# Patient Record
Sex: MALE | Race: ASIAN | NOT HISPANIC OR LATINO | URBAN - METROPOLITAN AREA
[De-identification: names, ages, dates, MRNs, and addresses within clinical notes are randomized per-mention and may not be internally consistent; named-entity substitution may affect disease eponyms.]

---

## 2024-07-06 ENCOUNTER — INPATIENT (INPATIENT)
Facility: HOSPITAL | Age: 54
LOS: 2 days | Discharge: HOME | DRG: 316 | End: 2024-07-09
Attending: STUDENT IN AN ORGANIZED HEALTH CARE EDUCATION/TRAINING PROGRAM | Admitting: STUDENT IN AN ORGANIZED HEALTH CARE EDUCATION/TRAINING PROGRAM
Payer: COMMERCIAL

## 2024-07-06 VITALS
DIASTOLIC BLOOD PRESSURE: 65 MMHG | TEMPERATURE: 99 F | OXYGEN SATURATION: 98 % | SYSTOLIC BLOOD PRESSURE: 100 MMHG | WEIGHT: 154.98 LBS | RESPIRATION RATE: 18 BRPM | HEIGHT: 70 IN | HEART RATE: 71 BPM

## 2024-07-06 DIAGNOSIS — E78.5 HYPERLIPIDEMIA, UNSPECIFIED: ICD-10-CM

## 2024-07-06 DIAGNOSIS — I42.1 OBSTRUCTIVE HYPERTROPHIC CARDIOMYOPATHY: ICD-10-CM

## 2024-07-06 DIAGNOSIS — Z29.9 ENCOUNTER FOR PROPHYLACTIC MEASURES, UNSPECIFIED: ICD-10-CM

## 2024-07-06 LAB
ALBUMIN SERPL ELPH-MCNC: 4.4 G/DL — SIGNIFICANT CHANGE UP (ref 3.3–5)
ALP SERPL-CCNC: 101 U/L — SIGNIFICANT CHANGE UP (ref 40–120)
ALT FLD-CCNC: 24 U/L — SIGNIFICANT CHANGE UP (ref 10–45)
ANION GAP SERPL CALC-SCNC: 13 MMOL/L — SIGNIFICANT CHANGE UP (ref 5–17)
APTT BLD: 29.1 SEC — SIGNIFICANT CHANGE UP (ref 24.5–35.6)
AST SERPL-CCNC: 21 U/L — SIGNIFICANT CHANGE UP (ref 10–40)
BASOPHILS # BLD AUTO: 0.04 K/UL — SIGNIFICANT CHANGE UP (ref 0–0.2)
BASOPHILS NFR BLD AUTO: 0.5 % — SIGNIFICANT CHANGE UP (ref 0–2)
BILIRUB SERPL-MCNC: 0.6 MG/DL — SIGNIFICANT CHANGE UP (ref 0.2–1.2)
BUN SERPL-MCNC: 14 MG/DL — SIGNIFICANT CHANGE UP (ref 7–23)
CALCIUM SERPL-MCNC: 9.4 MG/DL — SIGNIFICANT CHANGE UP (ref 8.4–10.5)
CHLORIDE SERPL-SCNC: 104 MMOL/L — SIGNIFICANT CHANGE UP (ref 96–108)
CO2 SERPL-SCNC: 22 MMOL/L — SIGNIFICANT CHANGE UP (ref 22–31)
CREAT SERPL-MCNC: 0.92 MG/DL — SIGNIFICANT CHANGE UP (ref 0.5–1.3)
EGFR: 101 ML/MIN/1.73M2 — SIGNIFICANT CHANGE UP
EOSINOPHIL # BLD AUTO: 0.26 K/UL — SIGNIFICANT CHANGE UP (ref 0–0.5)
EOSINOPHIL NFR BLD AUTO: 3 % — SIGNIFICANT CHANGE UP (ref 0–6)
GLUCOSE SERPL-MCNC: 106 MG/DL — HIGH (ref 70–99)
HCT VFR BLD CALC: 45.8 % — SIGNIFICANT CHANGE UP (ref 39–50)
HGB BLD-MCNC: 15.7 G/DL — SIGNIFICANT CHANGE UP (ref 13–17)
IMM GRANULOCYTES NFR BLD AUTO: 0.2 % — SIGNIFICANT CHANGE UP (ref 0–0.9)
INR BLD: 1.08 RATIO — SIGNIFICANT CHANGE UP (ref 0.85–1.18)
LYMPHOCYTES # BLD AUTO: 1.99 K/UL — SIGNIFICANT CHANGE UP (ref 1–3.3)
LYMPHOCYTES # BLD AUTO: 22.8 % — SIGNIFICANT CHANGE UP (ref 13–44)
MCHC RBC-ENTMCNC: 31 PG — SIGNIFICANT CHANGE UP (ref 27–34)
MCHC RBC-ENTMCNC: 34.3 GM/DL — SIGNIFICANT CHANGE UP (ref 32–36)
MCV RBC AUTO: 90.3 FL — SIGNIFICANT CHANGE UP (ref 80–100)
MONOCYTES # BLD AUTO: 0.94 K/UL — HIGH (ref 0–0.9)
MONOCYTES NFR BLD AUTO: 10.8 % — SIGNIFICANT CHANGE UP (ref 2–14)
NEUTROPHILS # BLD AUTO: 5.49 K/UL — SIGNIFICANT CHANGE UP (ref 1.8–7.4)
NEUTROPHILS NFR BLD AUTO: 62.7 % — SIGNIFICANT CHANGE UP (ref 43–77)
NRBC # BLD: 0 /100 WBCS — SIGNIFICANT CHANGE UP (ref 0–0)
PLATELET # BLD AUTO: 172 K/UL — SIGNIFICANT CHANGE UP (ref 150–400)
POTASSIUM SERPL-MCNC: 4.1 MMOL/L — SIGNIFICANT CHANGE UP (ref 3.5–5.3)
POTASSIUM SERPL-SCNC: 4.1 MMOL/L — SIGNIFICANT CHANGE UP (ref 3.5–5.3)
PROT SERPL-MCNC: 7.6 G/DL — SIGNIFICANT CHANGE UP (ref 6–8.3)
PROTHROM AB SERPL-ACNC: 11.3 SEC — SIGNIFICANT CHANGE UP (ref 9.5–13)
RBC # BLD: 5.07 M/UL — SIGNIFICANT CHANGE UP (ref 4.2–5.8)
RBC # FLD: 12.7 % — SIGNIFICANT CHANGE UP (ref 10.3–14.5)
SODIUM SERPL-SCNC: 139 MMOL/L — SIGNIFICANT CHANGE UP (ref 135–145)
TROPONIN T, HIGH SENSITIVITY RESULT: 17 NG/L — SIGNIFICANT CHANGE UP (ref 0–51)
TROPONIN T, HIGH SENSITIVITY RESULT: 17 NG/L — SIGNIFICANT CHANGE UP (ref 0–51)
WBC # BLD: 8.74 K/UL — SIGNIFICANT CHANGE UP (ref 3.8–10.5)
WBC # FLD AUTO: 8.74 K/UL — SIGNIFICANT CHANGE UP (ref 3.8–10.5)

## 2024-07-06 PROCEDURE — 99223 1ST HOSP IP/OBS HIGH 75: CPT

## 2024-07-06 PROCEDURE — 99285 EMERGENCY DEPT VISIT HI MDM: CPT

## 2024-07-06 PROCEDURE — 71045 X-RAY EXAM CHEST 1 VIEW: CPT | Mod: 26

## 2024-07-06 PROCEDURE — 93308 TTE F-UP OR LMTD: CPT | Mod: 26

## 2024-07-06 RX ORDER — HEPARIN SODIUM 50 [USP'U]/ML
4000 INJECTION, SOLUTION INTRAVENOUS ONCE
Refills: 0 | Status: COMPLETED | OUTPATIENT
Start: 2024-07-06 | End: 2024-07-06

## 2024-07-06 RX ORDER — ATORVASTATIN CALCIUM 20 MG/1
10 TABLET, FILM COATED ORAL AT BEDTIME
Refills: 0 | Status: DISCONTINUED | OUTPATIENT
Start: 2024-07-06 | End: 2024-07-09

## 2024-07-06 RX ORDER — METOPROLOL TARTRATE 50 MG
25 TABLET ORAL
Refills: 0 | Status: DISCONTINUED | OUTPATIENT
Start: 2024-07-06 | End: 2024-07-09

## 2024-07-06 RX ORDER — HEPARIN SODIUM 50 [USP'U]/ML
INJECTION, SOLUTION INTRAVENOUS
Qty: 25000 | Refills: 0 | Status: DISCONTINUED | OUTPATIENT
Start: 2024-07-06 | End: 2024-07-06

## 2024-07-06 RX ORDER — ASPIRIN 325 MG/1
162 TABLET, FILM COATED ORAL ONCE
Refills: 0 | Status: COMPLETED | OUTPATIENT
Start: 2024-07-06 | End: 2024-07-06

## 2024-07-06 RX ORDER — HEPARIN SODIUM 50 [USP'U]/ML
4000 INJECTION, SOLUTION INTRAVENOUS EVERY 6 HOURS
Refills: 0 | Status: DISCONTINUED | OUTPATIENT
Start: 2024-07-06 | End: 2024-07-06

## 2024-07-06 RX ORDER — ACETAMINOPHEN 325 MG
650 TABLET ORAL EVERY 6 HOURS
Refills: 0 | Status: DISCONTINUED | OUTPATIENT
Start: 2024-07-06 | End: 2024-07-09

## 2024-07-06 RX ORDER — NICOTINE POLACRILEX 2 MG/1
1 LOZENGE ORAL DAILY
Refills: 0 | Status: DISCONTINUED | OUTPATIENT
Start: 2024-07-06 | End: 2024-07-09

## 2024-07-06 RX ORDER — METOPROLOL TARTRATE 50 MG
25 TABLET ORAL
Refills: 0 | Status: DISCONTINUED | OUTPATIENT
Start: 2024-07-06 | End: 2024-07-06

## 2024-07-06 RX ORDER — TICAGRELOR 90 MG/1
180 TABLET ORAL ONCE
Refills: 0 | Status: COMPLETED | OUTPATIENT
Start: 2024-07-06 | End: 2024-07-06

## 2024-07-06 RX ORDER — ATORVASTATIN CALCIUM 20 MG/1
1 TABLET, FILM COATED ORAL
Refills: 0 | DISCHARGE

## 2024-07-06 RX ADMIN — HEPARIN SODIUM 900 UNIT(S)/HR: 50 INJECTION, SOLUTION INTRAVENOUS at 17:00

## 2024-07-06 RX ADMIN — ASPIRIN 162 MILLIGRAM(S): 325 TABLET, FILM COATED ORAL at 16:46

## 2024-07-06 RX ADMIN — HEPARIN SODIUM 4000 UNIT(S): 50 INJECTION, SOLUTION INTRAVENOUS at 16:59

## 2024-07-06 RX ADMIN — ATORVASTATIN CALCIUM 10 MILLIGRAM(S): 20 TABLET, FILM COATED ORAL at 22:20

## 2024-07-06 RX ADMIN — TICAGRELOR 180 MILLIGRAM(S): 90 TABLET ORAL at 16:46

## 2024-07-06 RX ADMIN — Medication 25 MILLIGRAM(S): at 22:20

## 2024-07-06 NOTE — ED PROVIDER NOTE - PROGRESS NOTE DETAILS
immediately upon the patient's arrival to the [facility] cardiology fellow was made aware of STEMI ecg and evaluated pt at the bedside. -Otis Elmore PA-C Bedside echo suggests the presence of HOCM; and as such patient may not need to be taken urgently to the Cath Lab.  Patient still rates his pain 4 out of 10 chest pressure.  Because these EKG findings and ultrasound findings may all be consistent with HOCM, he may not require urgent cardiac cath.  In the meantime we have loaded him with aspirin, Brilinta, and heparin.  I have discussed this case with the Attending, Dr. Carlos Elliott. Emergency Medicine / Medical Toxicology Attending MD Silva: first troponin 17.  High suspicion that ECG findings are all due to HOCM.  Cardiology does not plan to take to the Cath Lab at this time.  Recommending admission to medicine on telemetry; trend troponin.  We will discuss with unattached hospitalist, Dr. Bobby Thomason

## 2024-07-06 NOTE — CONSULT NOTE ADULT - ATTENDING COMMENTS
53M w/ PMH of active smoking, HTN, HLD presenting with SOB/chest pressure. EKG changes likely secondary to severe LVH, with concern for apical HCM on POCUS    formal echo is pending  agree with starting a low dose BB and discontinuing the hep gtt  may need asa 81 mg in light of his risk factors (not sure if he ever had an ischemic eval in [place])  in light of the risk factors, would consider an ischemic evaluation   would order a cMRI to evaluate apical wall thickness (cMRI shows loss of the usual apical wall thickness tapering due to apical wall thickness exceeding basal wall thickness)

## 2024-07-06 NOTE — H&P ADULT - ASSESSMENT
53 year old male with hx of HLD, active smoking, presenting with chest pain/pressure and SOB. Initial EKG and presentation concerning for HCM. Pt admitted for further workup and management.

## 2024-07-06 NOTE — ED ADULT NURSE NOTE - OBJECTIVE STATEMENT
53 y.o M sent in from PCP p/w c/o chest pain. A+OX4. Per pt daughter states at 0200 this AM was sleeping and was woken up by sudden onset L sided chest pain described as "pressure" rated 8/10 on pain scale. Reports lasted until 0500 AM, didn't take any analgesics. Woke up this AM and went to PCP, had EKG done and was told to come to ER due to MI. Upon initial assessment, STEMI noted on EKG. Associated sx being nausea, no vomiting, SOB, and diaphoresis. Currently denying any nausea, diaphoresis noted. Just moved to [place] x3 wks ago, saw cardiologist in [facility] but never has stress test done. Pain and SOB non-exertional in nature. Current everyday smoker, less than monthly drinker. PMH HTN, HLD. Denies any dizziness/ lightheadedness, vision changes, swelling of extremities, urinary sx, abd pain. No other complaints at this time, daughter at bedside for translation, safety maintained.

## 2024-07-06 NOTE — H&P ADULT - NSHPLABSRESULTS_GEN_ALL_CORE
07-06    139  |  104  |  14  ----------------------------<  106<H>  4.1   |  22  |  0.92    Ca    9.4      06 Jul 2024 16:44    TPro  7.6  /  Alb  4.4  /  TBili  0.6  /  DBili  x   /  AST  21  /  ALT  24  /  AlkPhos  101  07-06          PT/INR - ( 06 Jul 2024 16:44 )   PT: 11.3 sec;   INR: 1.08 ratio         PTT - ( 06 Jul 2024 16:44 )  PTT:29.1 sec              Urinalysis Basic - ( 06 Jul 2024 16:44 )    Color: x / Appearance: x / SG: x / pH: x  Gluc: 106 mg/dL / Ketone: x  / Bili: x / Urobili: x   Blood: x / Protein: x / Nitrite: x   Leuk Esterase: x / RBC: x / WBC x   Sq Epi: x / Non Sq Epi: x / Bacteria: x                              15.7   8.74  )-----------( 172      ( 06 Jul 2024 16:44 )             45.8     CAPILLARY BLOOD GLUCOSE 07-06    139  |  104  |  14  ----------------------------<  106<H>  4.1   |  22  |  0.92    Ca    9.4      06 Jul 2024 16:44    TPro  7.6  /  Alb  4.4  /  TBili  0.6  /  DBili  x   /  AST  21  /  ALT  24  /  AlkPhos  101  07-06      PT/INR - ( 06 Jul 2024 16:44 )   PT: 11.3 sec;   INR: 1.08 ratio         PTT - ( 06 Jul 2024 16:44 )  PTT:29.1 sec              Urinalysis Basic - ( 06 Jul 2024 16:44 )    Color: x / Appearance: x / SG: x / pH: x  Gluc: 106 mg/dL / Ketone: x  / Bili: x / Urobili: x   Blood: x / Protein: x / Nitrite: x   Leuk Esterase: x / RBC: x / WBC x   Sq Epi: x / Non Sq Epi: x / Bacteria: x                          15.7   8.74  )-----------( 172      ( 06 Jul 2024 16:44 )             45.8

## 2024-07-06 NOTE — ED PROVIDER NOTE - OBJECTIVE STATEMENT
53-year-old male PMH of HTN, HLD, smoker for 30 years, occasional EtOH use presents to the ED with sister who interprets in mandrin for the patient complaining of intermittent chest pressure and shortness of breath today which began approximately 2 AM.  Patient traveled to the  from [place] 3 weeks ago.  Patient reports several similar episodes over the past few weeks.  Patient reports diaphoresis.  Patient with active chest pain at this time.  Was seen in outpatient PCP office today and referred to the ED as he began having more severe symptoms at the office.  Upon arrival to ED patient with STEMI ECG in the triage area.

## 2024-07-06 NOTE — ED PROVIDER NOTE - CARE PLAN
1 Principal Discharge DX:	STEMI (ST elevation myocardial infarction)   Principal Discharge DX:	HOCM (hypertrophic obstructive cardiomyopathy)

## 2024-07-06 NOTE — CONSULT NOTE ADULT - SUBJECTIVE AND OBJECTIVE BOX
Dieudonne Caballero MD  Cardiology Fellow  [phone]  All Cardiology service information can be found 24/7 on [email or web address], password: [email or web address]    Patient seen and evaluated at bedside    HPI: 53M w/ PMH of active smoking, HTN, HLD presenting with chest pressure and SOB. Started last night at 2am, lasted 3 hours. Went to PCP today, and was sent to the ED due to abnormal EKG. On route developed chest pressure again. In the ED, initial EKG showing LOLLY in aVR, V1, V2, and widespread depressions, as well as LVH. POCUS was done which shows severe LVH as well as possible apical HCM. Patient initially loaded with ASA, brillinta, heparin. Troponins came back 17 -> 17. Vitally stable.     Per family, patient had an echo done in [facility] that also read concern for apical HCM. Patient does not follow with cardiologist.       Allergies:  No Known Allergies      Current Medications:   heparin   Injectable 4000 Unit(s) IV Push every 6 hours PRN  heparin  Infusion.  Unit(s)/Hr IV Continuous <Continuous>      FAMILY HISTORY:        REVIEW OF SYSTEMS:  CONSTITUTIONAL: No weakness, fevers or chills  EYES/ENT: No visual changes;  No dysphagia  NECK: No pain or stiffness  RESPIRATORY: No cough, wheezing, hemoptysis; No shortness of breath  CARDIOVASCULAR: No chest pain or palpitations; No lower extremity edema  GASTROINTESTINAL: No abdominal or epigastric pain. No nausea, vomiting, or hematemesis; No diarrhea or constipation. No melena or hematochezia.  BACK: No back pain  GENITOURINARY: No dysuria, frequency or hematuria  NEUROLOGICAL: No numbness or weakness  SKIN: No itching, burning, rashes, or lesions   All other review of systems is negative unless indicated above.    Physical Exam:  T(F): 98.4 (07-06), Max: 98.6 (07-06)  HR: 62 (07-06) (62 - 71)  BP: 106/68 (07-06) (100/65 - 130/70)  RR: 20 (07-06)  SpO2: 98% (07-06)  GEN: NAD  HEENT: EOMI, clear sclera  PULM: CTA b/l, no wheeze  CV: RRR S1 S2, no murmur, no JVD  ABD: S, NT, ND  EXT: WWP, no edema  PSYCH: normal affect  SKIN: No rash    CXR: Personally reviewed    Labs: Personally reviewed                        15.7   8.74  )-----------( 172      ( 06 Jul 2024 16:44 )             45.8     07-06    139  |  104  |  14  ----------------------------<  106<H>  4.1   |  22  |  0.92    Ca    9.4      06 Jul 2024 16:44    TPro  7.6  /  Alb  4.4  /  TBili  0.6  /  DBili  x   /  AST  21  /  ALT  24  /  AlkPhos  101  07-06    PT/INR - ( 06 Jul 2024 16:44 )   PT: 11.3 sec;   INR: 1.08 ratio         PTT - ( 06 Jul 2024 16:44 )  PTT:29.1 sec    CARDIAC MARKERS ( 06 Jul 2024 18:01 )  17 ng/L / x     / x     / x     / x     / x      CARDIAC MARKERS ( 06 Jul 2024 16:44 )  17 ng/L / x     / x     / x     / x     / x

## 2024-07-06 NOTE — CONSULT NOTE ADULT - ASSESSMENT
53M w/ PMH of active smoking, HTN, HLD presenting with SOB/chest pressure. EKG changes likely secondary to severe LVH, with concern for apical HCM on POCUS. Will need formal TTE to evaluate intracavitary gradients. May need eventual primary prevention ICD if truly apical HCM.     Recs:  -discontinue heparin gtt and DAPT  -start metoprolol 25mg BID  -obtain A1C, lipid panel  -obtain formal TTE  -monitor on telemetry

## 2024-07-06 NOTE — H&P ADULT - NSHPPHYSICALEXAM_GEN_ALL_CORE
Vital Signs Last 24 Hrs  T(C): 36.9 (06 Jul 2024 16:33), Max: 37 (06 Jul 2024 16:21)  T(F): 98.4 (06 Jul 2024 16:33), Max: 98.6 (06 Jul 2024 16:21)  HR: 63 (06 Jul 2024 20:29) (62 - 71)  BP: 117/75 (06 Jul 2024 20:29) (100/65 - 130/70)  BP(mean): 88 (06 Jul 2024 20:29) (79 - 88)  RR: 20 (06 Jul 2024 20:29) (18 - 20)  SpO2: 99% (06 Jul 2024 20:29) (97% - 99%)    Parameters below as of 06 Jul 2024 20:29  Patient On (Oxygen Delivery Method): room air        CONSTITUTIONAL: Well-groomed, in no apparent distress  EYES: No conjunctival or scleral injection, non-icteric; PERRLA and symmetric  ENMT: No external nasal lesions; nasal mucosa not inflamed; normal dentition; no pharyngeal injection or exudates, oral mucosa with moist membranes  RESPIRATORY: Breathing comfortably; lungs CTA without wheeze/rhonchi/rales  CARDIOVASCULAR: +S1S2, RRR, no M/G/R; no carotid bruits; pedal pulses full and symmetric; no lower extremity edema  GASTROINTESTINAL: No palpable masses or tenderness, +BS throughout, no rebound/guarding; no hepatosplenomegaly; no hernia palpated  GENITOURINARY:  LYMPHATIC: No cervical LAD or tenderness; no axillary LAD or tenderness; no inguinal LAD or tenderness  MUSCULOSKELETAL: Normal gait and station; no digital clubbing or cyanosis; no paraspinal tenderness; no malalignment of extremities  SKIN: No rashes or ulcers noted; no subcutaneous nodules or induration palpable  NEUROLOGIC: CN grossly intact; sensation intact in LEs b/l to light touch; normal strength and tone  PSYCHIATRIC: A+O x 3; mood and affect appropriate; appropriate insight and judgment Vital Signs Last 24 Hrs  T(C): 36.9 (06 Jul 2024 16:33), Max: 37 (06 Jul 2024 16:21)  T(F): 98.4 (06 Jul 2024 16:33), Max: 98.6 (06 Jul 2024 16:21)  HR: 63 (06 Jul 2024 20:29) (62 - 71)  BP: 117/75 (06 Jul 2024 20:29) (100/65 - 130/70)  BP(mean): 88 (06 Jul 2024 20:29) (79 - 88)  RR: 20 (06 Jul 2024 20:29) (18 - 20)  SpO2: 99% (06 Jul 2024 20:29) (97% - 99%)    Parameters below as of 06 Jul 2024 20:29  Patient On (Oxygen Delivery Method): room air        CONSTITUTIONAL: Well-groomed, in no apparent distress  EYES: No conjunctival or scleral injection, non-icteric; PERRLA and symmetric  ENMT: No external nasal lesions; nasal mucosa not inflamed; oral mucosa with moist membranes  RESPIRATORY: Breathing comfortably; lungs CTA without wheeze/rhonchi/rales  CARDIOVASCULAR: +S1S2, RRR, no M/G/R; no lower extremity edema  GASTROINTESTINAL: No palpable masses or tenderness, +BS throughout, no rebound/guarding; no hepatosplenomegaly; no hernia palpated  MUSCULOSKELETAL: No digital clubbing or cyanosis; no paraspinal tenderness; no malalignment of extremities  SKIN: No rashes or ulcers noted; no subcutaneous nodules or induration palpable  NEUROLOGIC: CN grossly intact; sensation intact in LEs b/l to light touch; normal strength and tone  PSYCHIATRIC: A+O x 3; mood and affect appropriate; appropriate insight and judgment

## 2024-07-06 NOTE — H&P ADULT - HISTORY OF PRESENT ILLNESS
53 year old male with hx of HTN, HLD, active smoker, presenting with chest pressure and SOB.  53 year old male with hx of HLD, active smoker, presenting with intermittent chest pain/pressure and SOB. Patient visiting [place] from [place], arrived about 3 weeks ago. Patient endorses intermittent chest pressure and pain for the past 3 years but recently, his symptoms have become more frequent and severe. Pt notes severe midsternal chest pressure that would occur randomly without any specific trigger. He has seen a doctor in [place] and was told that he had a "fat heart". He was prescribed diltiazem 90mg qd but has not been taking it consistently. He smokes actively, about 10+cigarettes daily for the past 30 years. He drinks alcohol occasionally.     In the ED, on arrival, EKG noted to have LOLLY in leads avr, v1, v2 with widespread TWI. POCUS in ED concerning for apical HCM. Pt evaluated by cardiology. Initially started on heparin gtt and DAPT for ACS but was stopped since EKG findings likely due to HCM. Troponin 17 --> 17. Pt admitted for further management.

## 2024-07-06 NOTE — ED PROVIDER NOTE - PHYSICAL EXAMINATION
GEN: Pt in NAD, appears uncomfortable, A&Ox3  PSYCH: Affect appropriate.  EYES: Sclera white w/o injection.   ENT: Head NCAT. MMM. Neck supple FROM.  RESP: CTA b/l   CARDIAC: RRR, clear distinct S1, S2,  ABD: Abdomen soft, non-tender.   VASC: No edema or calf tenderness.  SKIN: No rashes on the trunk.

## 2024-07-06 NOTE — H&P ADULT - NSHPREVIEWOFSYSTEMS_GEN_ALL_CORE
Review of Systems:   CONSTITUTIONAL: No fever, weight loss  EYES: No eye pain, visual disturbances, or discharge  ENMT:  No difficulty hearing, tinnitus, vertigo; No sinus or throat pain  RESPIRATORY: No SOB. No cough, wheezing, chills or hemoptysis  CARDIOVASCULAR: No chest pain, palpitations, dizziness, or leg swelling  GASTROINTESTINAL: No abdominal or epigastric pain. No nausea, vomiting, or hematemesis; No diarrhea or constipation. No melena or hematochezia.  GENITOURINARY: No dysuria, frequency, hematuria, or incontinence  NEUROLOGICAL: No headaches, memory loss, loss of strength, numbness, or tremors  SKIN: No itching, burning, rashes, or lesions   LYMPH NODES: No enlarged glands  ENDOCRINE: No heat or cold intolerance; No hair loss  MUSCULOSKELETAL: No joint pain or swelling; No muscle, back pain  PSYCHIATRIC: No depression, anxiety, mood swings, or difficulty sleeping  HEME/LYMPH: No easy bruising, or bleeding gums Review of Systems:   CONSTITUTIONAL: No fever, weight loss  EYES: No eye pain, visual disturbances, or discharge  ENMT:  No difficulty hearing, tinnitus, vertigo; No sinus or throat pain  RESPIRATORY: No SOB. No cough, wheezing, chills or hemoptysis  CARDIOVASCULAR: No chest pain, palpitations, dizziness, or leg swelling  GASTROINTESTINAL: No abdominal or epigastric pain. No nausea, vomiting, or hematemesis; No diarrhea or constipation. No melena or hematochezia.  GENITOURINARY: No dysuria, frequency, hematuria, or incontinence  NEUROLOGICAL: No headaches, memory loss, loss of strength, numbness, or tremors  SKIN: No itching, burning, rashes, or lesions   MUSCULOSKELETAL: No joint pain or swelling; No muscle, back pain  HEME/LYMPH: No easy bruising, or bleeding gums

## 2024-07-06 NOTE — ED ADULT NURSE NOTE - NSFALLUNIVINTERV_ED_ALL_ED
Bed/Stretcher in lowest position, wheels locked, appropriate side rails in place/Call bell, personal items and telephone in reach/Instruct patient to call for assistance before getting out of bed/chair/stretcher/Non-slip footwear applied when patient is off stretcher/[place] to call system/Physically safe environment - no spills, clutter or unnecessary equipment/Purposeful proactive rounding/Room/bathroom lighting operational, light cord in reach

## 2024-07-06 NOTE — ED PROVIDER NOTE - CLINICAL SUMMARY MEDICAL DECISION MAKING FREE TEXT BOX
Medical Decision Making / Differential Diagnosis:  HPI concerning for STEMI.  Cardiology fellow at the bedside, aspirin, Brilinta ordered, heparin load ordered    ECG directly visualized by me and shows ST elevations in aVR and reciprocal depressions to aVF ST elevations V1 V2 V3 with deep T wave inversions V3-6    Upon my evaluation, this patient had a high probability of imminent or life-threatening deterioration due to STEMI, which required my direct attention, intervention, and personal management.  The patient has a medical condition that impairs on or more vital organ systems.  Frequent personal assessment and adjustment of medical interventions was performed.     I have personally provided 30 minutes of critical care time exclusive of time spent on separately billable procedures.  Time includes review of laboratory data, radiology results, discussion with consultants, patient and family; monitoring for potential decompensation, as well as time spent retrieving data and reviewing the chart and documenting the visit.  Interventions we performed as documented above.

## 2024-07-06 NOTE — H&P ADULT - PROBLEM SELECTOR PLAN 1
EKG findings with LOLLY in avr, v1, v2 and widespread STD initially concerning for STEMI though troponin flat. POCUS also suggestive of apical HCM which may explain EKG findings and symptoms.   - Cardiology consult recommendations appreciated  - Hold ACS management - heparin gtt and DAPT discontinued  - Start metoprolol tartrate 25mg BID  - formal TTE ordered   - F/u cardiology regarding possible ischemic evaluation. ICD placement  - Avoid diuresis, nitroglycerin, arterial vasodilators as can decrease preload and afterload  - Monitor on telemetry

## 2024-07-06 NOTE — ED PROVIDER NOTE - ATTENDING APP SHARED VISIT CONTRIBUTION OF CARE
Emergency Medicine Attending MD Silva:  patient seen and evaluated with the PA.  I was present for key portions of the History and Physical, and I agree with the Impression and Plan.      Patient is a 53-year-old male complaining of less than 24 hours of intermittent substernal chest pressure with associated dyspnea on exertion.  Currently having substernal chest pressure.  Screening ECG in triage indicates STEMI.  Cardiology consulted.    VS: wnl  Gen: Slightly uncomfortable appearing, but overall in minimal distress  Head: NC/AT  Neck: trachea midline  Resp:  No distress  CV: RRR, no RMG  Abd: nondistended  Ext: no deformities  Neuro:  A&Ox4 appears non focal  Skin:  Warm and dry as visualized  Psych: appropriate    Medical Decision Making / Differential Diagnosis:  HPI concerning for STEMI.  Cardiology fellow at the bedside, aspirin, Brilinta ordered, heparin load ordered    ECG directly visualized by me and shows ST elevations in aVR and reciprocal depressions to aVF ST elevations V1 V2 V3 with deep T wave inversions V3-6    Upon my evaluation, this patient had a high probability of imminent or life-threatening deterioration due to STEMI, which required my direct attention, intervention, and personal management.  The patient has a medical condition that impairs on or more vital organ systems.  Frequent personal assessment and adjustment of medical interventions was performed.     I have personally provided 30 minutes of critical care time exclusive of time spent on separately billable procedures.  Time includes review of laboratory data, radiology results, discussion with consultants, patient and family; monitoring for potential decompensation, as well as time spent retrieving data and reviewing the chart and documenting the visit.  Interventions we performed as documented above.

## 2024-07-07 LAB
A1C WITH ESTIMATED AVERAGE GLUCOSE RESULT: 5.7 % — HIGH (ref 4–5.6)
ANION GAP SERPL CALC-SCNC: 13 MMOL/L — SIGNIFICANT CHANGE UP (ref 5–17)
BUN SERPL-MCNC: 14 MG/DL — SIGNIFICANT CHANGE UP (ref 7–23)
CALCIUM SERPL-MCNC: 9.3 MG/DL — SIGNIFICANT CHANGE UP (ref 8.4–10.5)
CHLORIDE SERPL-SCNC: 108 MMOL/L — SIGNIFICANT CHANGE UP (ref 96–108)
CHOLEST SERPL-MCNC: 152 MG/DL — SIGNIFICANT CHANGE UP
CO2 SERPL-SCNC: 19 MMOL/L — LOW (ref 22–31)
CREAT SERPL-MCNC: 0.77 MG/DL — SIGNIFICANT CHANGE UP (ref 0.5–1.3)
EGFR: 108 ML/MIN/1.73M2 — SIGNIFICANT CHANGE UP
ESTIMATED AVERAGE GLUCOSE: 117 MG/DL — HIGH (ref 68–114)
GLUCOSE SERPL-MCNC: 98 MG/DL — SIGNIFICANT CHANGE UP (ref 70–99)
HCT VFR BLD CALC: 46.7 % — SIGNIFICANT CHANGE UP (ref 39–50)
HDLC SERPL-MCNC: 39 MG/DL — LOW
HGB BLD-MCNC: 15.9 G/DL — SIGNIFICANT CHANGE UP (ref 13–17)
LIPID PNL WITH DIRECT LDL SERPL: 75 MG/DL — SIGNIFICANT CHANGE UP
MCHC RBC-ENTMCNC: 31.2 PG — SIGNIFICANT CHANGE UP (ref 27–34)
MCHC RBC-ENTMCNC: 34 GM/DL — SIGNIFICANT CHANGE UP (ref 32–36)
MCV RBC AUTO: 91.6 FL — SIGNIFICANT CHANGE UP (ref 80–100)
NON HDL CHOLESTEROL: 113 MG/DL — SIGNIFICANT CHANGE UP
NRBC # BLD: 0 /100 WBCS — SIGNIFICANT CHANGE UP (ref 0–0)
PLATELET # BLD AUTO: 177 K/UL — SIGNIFICANT CHANGE UP (ref 150–400)
POTASSIUM SERPL-MCNC: 4.1 MMOL/L — SIGNIFICANT CHANGE UP (ref 3.5–5.3)
POTASSIUM SERPL-SCNC: 4.1 MMOL/L — SIGNIFICANT CHANGE UP (ref 3.5–5.3)
RBC # BLD: 5.1 M/UL — SIGNIFICANT CHANGE UP (ref 4.2–5.8)
RBC # FLD: 13 % — SIGNIFICANT CHANGE UP (ref 10.3–14.5)
SODIUM SERPL-SCNC: 140 MMOL/L — SIGNIFICANT CHANGE UP (ref 135–145)
TRIGL SERPL-MCNC: 229 MG/DL — HIGH
TSH SERPL-MCNC: 2.21 UIU/ML — SIGNIFICANT CHANGE UP (ref 0.27–4.2)
WBC # BLD: 6.28 K/UL — SIGNIFICANT CHANGE UP (ref 3.8–10.5)
WBC # FLD AUTO: 6.28 K/UL — SIGNIFICANT CHANGE UP (ref 3.8–10.5)

## 2024-07-07 PROCEDURE — 99253 IP/OBS CNSLTJ NEW/EST LOW 45: CPT | Mod: GC

## 2024-07-07 PROCEDURE — 99232 SBSQ HOSP IP/OBS MODERATE 35: CPT

## 2024-07-07 RX ADMIN — NICOTINE POLACRILEX 1 PATCH: 2 LOZENGE ORAL at 17:59

## 2024-07-07 RX ADMIN — NICOTINE POLACRILEX 1 PATCH: 2 LOZENGE ORAL at 20:37

## 2024-07-07 RX ADMIN — ATORVASTATIN CALCIUM 10 MILLIGRAM(S): 20 TABLET, FILM COATED ORAL at 21:37

## 2024-07-07 RX ADMIN — Medication 25 MILLIGRAM(S): at 18:00

## 2024-07-07 NOTE — PROGRESS NOTE ADULT - SUBJECTIVE AND OBJECTIVE BOX
[facility] Division of Hospital Medicine  Angela Lee DO  Available on Teams Xander    Patient is a 53y old  Male who presents with a chief complaint of Chest pain (06 Jul 2024 20:33)      SUBJECTIVE / OVERNIGHT EVENTS: none. Feels well, no shortness of breath, chest pain, has been walking around without difficulty. Wife at bedside to provide translation, they declined .   ADDITIONAL REVIEW OF SYSTEMS: negative    MEDICATIONS  (STANDING):  atorvastatin 10 milliGRAM(s) Oral at bedtime  metoprolol tartrate 25 milliGRAM(s) Oral two times a day    MEDICATIONS  (PRN):  acetaminophen     Tablet .. 650 milliGRAM(s) Oral every 6 hours PRN Temp greater or equal to 38C (100.4F), Mild Pain (1 - 3)  nicotine - 21 mG/24Hr(s) Patch 1 Patch Transdermal daily PRN nicotine craving      CAPILLARY BLOOD GLUCOSE        I&O's Summary    06 Jul 2024 07:01  -  07 Jul 2024 07:00  --------------------------------------------------------  IN: 0 mL / OUT: 200 mL / NET: -200 mL    07 Jul 2024 07:01  -  07 Jul 2024 11:31  --------------------------------------------------------  IN: 100 mL / OUT: 0 mL / NET: 100 mL        PHYSICAL EXAM:  Vital Signs Last 24 Hrs  T(C): 36.7 (07 Jul 2024 05:30), Max: 37 (06 Jul 2024 16:21)  T(F): 98 (07 Jul 2024 05:30), Max: 98.6 (06 Jul 2024 16:21)  HR: 60 (07 Jul 2024 05:30) (59 - 71)  BP: 114/72 (07 Jul 2024 05:30) (100/65 - 130/70)  BP(mean): 88 (06 Jul 2024 20:29) (79 - 88)  RR: 18 (07 Jul 2024 05:30) (18 - 20)  SpO2: 98% (07 Jul 2024 05:30) (97% - 99%)    Parameters below as of 07 Jul 2024 05:30  Patient On (Oxygen Delivery Method): room air        CONSTITUTIONAL: Well-groomed, in no apparent distress  EYES: No conjunctival or scleral injection, non-icteric; PERRLA and symmetric  ENMT: No external nasal lesions; no pharyngeal injection or exudates, oral mucosa with moist membranes  NECK: Trachea midline without palpable neck mass; thyroid not enlarged and non-tender  RESPIRATORY: Breathing comfortably; lungs CTA without wheeze/rhonchi/rales  CARDIOVASCULAR: +S1S2, RRR, no M/G/R; pedal pulses full and symmetric; no lower extremity edema  GASTROINTESTINAL: No palpable masses or tenderness, +BS throughout, no rebound/guarding  MUSCULOSKELETAL: no digital clubbing or cyanosis; no paraspinal tenderness; normal strength and tone of extremities  SKIN: No rashes or ulcers noted; no subcutaneous nodules or induration palpable  NEUROLOGIC: CN II-XII intact; sensation intact in LEs b/l to light touch  PSYCHIATRIC: A+O x 3; mood and affect appropriate; appropriate insight and judgment    LABS:                        15.9   6.28  )-----------( 177      ( 07 Jul 2024 07:12 )             46.7     07-07    140  |  108  |  14  ----------------------------<  98  4.1   |  19<L>  |  0.77    Ca    9.3      07 Jul 2024 07:18    TPro  7.6  /  Alb  4.4  /  TBili  0.6  /  DBili  x   /  AST  21  /  ALT  24  /  AlkPhos  101  07-06    PT/INR - ( 06 Jul 2024 16:44 )   PT: 11.3 sec;   INR: 1.08 ratio         PTT - ( 06 Jul 2024 16:44 )  PTT:29.1 sec      Urinalysis Basic - ( 07 Jul 2024 07:18 )    Color: x / Appearance: x / SG: x / pH: x  Gluc: 98 mg/dL / Ketone: x  / Bili: x / Urobili: x   Blood: x / Protein: x / Nitrite: x   Leuk Esterase: x / RBC: x / WBC x   Sq Epi: x / Non Sq Epi: x / Bacteria: x

## 2024-07-07 NOTE — CHART NOTE - NSCHARTNOTEFT_GEN_A_CORE
BRUNO HENDRIX    Notified by RN patient with Rt forearm swollen. Seen at bedside in NAD, when question about R arm, his wife at bedside said it's good now, no more swollen. Rt radial pulse palpable, no c/o pain for now. VSS.       Interventions taken     may need doppler to r/o DVT if c/o pain, edema, or redness.  cont assess and monitor  f/u with am team.                    Pipe La ANP-BC  Spectralink #[id number]

## 2024-07-08 LAB
GLUCOSE BLDC GLUCOMTR-MCNC: 90 MG/DL — SIGNIFICANT CHANGE UP (ref 70–99)
HCT VFR BLD CALC: 42.9 % — SIGNIFICANT CHANGE UP (ref 39–50)
HGB BLD-MCNC: 15.1 G/DL — SIGNIFICANT CHANGE UP (ref 13–17)
MCHC RBC-ENTMCNC: 30.9 PG — SIGNIFICANT CHANGE UP (ref 27–34)
MCHC RBC-ENTMCNC: 35.2 GM/DL — SIGNIFICANT CHANGE UP (ref 32–36)
MCV RBC AUTO: 87.7 FL — SIGNIFICANT CHANGE UP (ref 80–100)
NRBC # BLD: 0 /100 WBCS — SIGNIFICANT CHANGE UP (ref 0–0)
PLATELET # BLD AUTO: 163 K/UL — SIGNIFICANT CHANGE UP (ref 150–400)
RBC # BLD: 4.89 M/UL — SIGNIFICANT CHANGE UP (ref 4.2–5.8)
RBC # FLD: 12.6 % — SIGNIFICANT CHANGE UP (ref 10.3–14.5)
WBC # BLD: 6.19 K/UL — SIGNIFICANT CHANGE UP (ref 3.8–10.5)
WBC # FLD AUTO: 6.19 K/UL — SIGNIFICANT CHANGE UP (ref 3.8–10.5)

## 2024-07-08 PROCEDURE — 93306 TTE W/DOPPLER COMPLETE: CPT | Mod: 26

## 2024-07-08 PROCEDURE — 99232 SBSQ HOSP IP/OBS MODERATE 35: CPT

## 2024-07-08 PROCEDURE — 75561 CARDIAC MRI FOR MORPH W/DYE: CPT | Mod: 26

## 2024-07-08 PROCEDURE — 93356 MYOCRD STRAIN IMG SPCKL TRCK: CPT

## 2024-07-08 PROCEDURE — 99233 SBSQ HOSP IP/OBS HIGH 50: CPT | Mod: GC

## 2024-07-08 RX ADMIN — Medication 25 MILLIGRAM(S): at 21:33

## 2024-07-08 RX ADMIN — ATORVASTATIN CALCIUM 10 MILLIGRAM(S): 20 TABLET, FILM COATED ORAL at 21:33

## 2024-07-08 NOTE — PROGRESS NOTE ADULT - ASSESSMENT
53M w/ PMH of active smoking, HTN, HLD presenting with SOB/chest pressure. EKG changes likely secondary to severe LVH, with concern for apical HCM on POCUS. Will need formal TTE to evaluate intracavitary gradients. May need eventual primary prevention ICD if truly apical HCM.     Cardiac studies:  - none     #chest pressure  - trops wnl, EKG findings iso severe LVH   - no concern for ACS at this moment     #cardiomyopathy   POCUS showing concern for apical HCM? Prior TTE with similar results as per wife at bedside.   - c/w Lopressor 25mg BID  - obtain A1C, lipid panel  - pending formal TTE   - pending cMRI     Case discussed with Dr. Louie.     NOTE INCOMPLETE UNTIL ATTENDING ATTESTATION.  53M w/ PMH of active smoking, HTN, HLD presenting with SOB/chest pressure. EKG changes likely secondary to severe LVH, with concern for apical HCM on POCUS. Will need formal TTE to evaluate intracavitary gradients. May need eventual primary prevention ICD if truly apical HCM.     Cardiac studies:  - none     #chest pressure  - trops wnl, EKG findings iso severe LVH   - no concern for ACS at this moment     #cardiomyopathy   POCUS showing concern for apical HCM? Prior TTE with similar results as per wife at bedside.   - c/w Lopressor 25mg BID  - obtain A1C, lipid panel  - pending formal TTE   - pending cMRI     Case discussed with Dr. Boyd and Dr. Louie.     NOTE INCOMPLETE UNTIL ATTENDING ATTESTATION.  52 yo M w/ PMH of active smoking, HTN and HLD.   Presented with SOB/chest pressure. EKG changes seen, likely secondary to severe LVH, with concern for ?apical HCM on POCUS. Will need formal TTE to evaluate intracavitary gradients. May need eventual primary prevention ICD if truly apical HCM.     Cardiac studies:  - none       REC:  #1.  chest pressure  - trops wnl, EKG findings iso severe LVH   - no concern for ACS at this moment     #2.  cardiomyopathy - POCUS showing concern for apical HCM? Prior TTE with similar results as per wife at bedside.   - c/w Lopressor 25mg BID  - obtain A1C, lipid panel  - pending formal TTE   - pending cMRI     Case discussed with Dr. Boyd and Dr. Louie.       Omar Kaur MD  Cardiology resident     Plan discussed with cardiology fellow and resident.  Patient seen and examined.  Hx., exam and labs as above.  I agree with the assessment and recommendations, which I have reviewed and edited where appropriate.  Jovani Boyd M.D.  Cardiology Attending, Consult Service    For Cardiology consults and questions, all Cardiology service information can be found 24/7 on [email or web address] - use password: [email or web address] to log in.

## 2024-07-08 NOTE — PROGRESS NOTE ADULT - SUBJECTIVE AND OBJECTIVE BOX
[facility] Division of Hospital Medicine  Sharon Galvan DO   Available via Microsoft Teams      Patient is a 53y old  Male who presents with a chief complaint of Chest pain (07 Jul 2024 11:30)      SUBJECTIVE / OVERNIGHT EVENTS:  No CP, SOB, dizziness     MEDICATIONS  (STANDING):  atorvastatin 10 milliGRAM(s) Oral at bedtime  metoprolol tartrate 25 milliGRAM(s) Oral two times a day    MEDICATIONS  (PRN):  acetaminophen     Tablet .. 650 milliGRAM(s) Oral every 6 hours PRN Temp greater or equal to 38C (100.4F), Mild Pain (1 - 3)  nicotine - 21 mG/24Hr(s) Patch 1 Patch Transdermal daily PRN nicotine craving      CAPILLARY BLOOD GLUCOSE      POCT Blood Glucose.: 90 mg/dL (08 Jul 2024 17:21)    I&O's Summary    07 Jul 2024 07:01  -  08 Jul 2024 07:00  --------------------------------------------------------  IN: 100 mL / OUT: 0 mL / NET: 100 mL    08 Jul 2024 07:01  -  08 Jul 2024 17:37  --------------------------------------------------------  IN: 240 mL / OUT: 0 mL / NET: 240 mL        PHYSICAL EXAM:  Vital Signs Last 24 Hrs  T(C): 36.3 (08 Jul 2024 04:51), Max: 37.1 (07 Jul 2024 21:47)  T(F): 97.3 (08 Jul 2024 04:51), Max: 98.7 (07 Jul 2024 21:47)  HR: 52 (08 Jul 2024 04:51) (52 - 59)  BP: 112/70 (08 Jul 2024 04:51) (112/70 - 132/77)  BP(mean): --  RR: 17 (08 Jul 2024 04:51) (17 - 18)  SpO2: 97% (08 Jul 2024 04:51) (97% - 98%)    Parameters below as of 08 Jul 2024 04:51  Patient On (Oxygen Delivery Method): room air      CONSTITUTIONAL: NAD, well-developed, well-groomed  EYES: conjunctiva and sclera clear  ENMT: Moist oral mucosa  RESPIRATORY: Normal respiratory effort; lungs are clear to auscultation bilaterally  CARDIOVASCULAR: Regular rate and rhythm, normal S1 and S2; No lower extremity edema  ABDOMEN: Nontender to palpation, normoactive bowel sounds, no rebound/guarding  MUSCULOSKELETAL:  No clubbing or cyanosis of digits; no joint swelling or tenderness to palpation  PSYCH: A+O to person, place, and time; affect appropriate  SKIN: No rashes; no palpable lesions    LABS:                        15.1   6.19  )-----------( 163      ( 08 Jul 2024 07:15 )             42.9     07-07    140  |  108  |  14  ----------------------------<  98  4.1   |  19<L>  |  0.77    Ca    9.3      07 Jul 2024 07:18            Urinalysis Basic - ( 07 Jul 2024 07:18 )    Color: x / Appearance: x / SG: x / pH: x  Gluc: 98 mg/dL / Ketone: x  / Bili: x / Urobili: x   Blood: x / Protein: x / Nitrite: x   Leuk Esterase: x / RBC: x / WBC x   Sq Epi: x / Non Sq Epi: x / Bacteria: x          RADIOLOGY & ADDITIONAL TESTS:  Results Reviewed:   Imaging Personally Reviewed:  Electrocardiogram Personally Reviewed:    COORDINATION OF CARE:  Care Discussed with Consultants/Other Providers [Y/N]:  Prior or Outpatient Records Reviewed [Y/N]:

## 2024-07-08 NOTE — PROVIDER CONTACT NOTE (OTHER) - ASSESSMENT
right arm very slightly swollen fingers slightly puffy. radial pulse palpable, no redness noted, denies pain
asymptomatic, pt is sleeping

## 2024-07-08 NOTE — PROGRESS NOTE ADULT - SUBJECTIVE AND OBJECTIVE BOX
Progress Note    07-06-24 (2d)    Patient is a 53y old  Male who presents with a chief complaint of Chest pain (07 Jul 2024 11:30)      Subjective / Overnight Events :  - No acute events overnight.  - Pt seen and examined at bedside.     Additional ROS (if any):    MEDICATIONS  (STANDING):  atorvastatin 10 milliGRAM(s) Oral at bedtime  metoprolol tartrate 25 milliGRAM(s) Oral two times a day    MEDICATIONS  (PRN):  acetaminophen     Tablet .. 650 milliGRAM(s) Oral every 6 hours PRN Temp greater or equal to 38C (100.4F), Mild Pain (1 - 3)  nicotine - 21 mG/24Hr(s) Patch 1 Patch Transdermal daily PRN nicotine craving          PHYSICAL EXAM:  Vital Signs Last 24 Hrs  T(C): 36.3 (08 Jul 2024 04:51), Max: 37.1 (07 Jul 2024 21:47)  T(F): 97.3 (08 Jul 2024 04:51), Max: 98.7 (07 Jul 2024 21:47)  HR: 52 (08 Jul 2024 04:51) (52 - 61)  BP: 112/70 (08 Jul 2024 04:51) (112/70 - 132/77)  BP(mean): --  RR: 17 (08 Jul 2024 04:51) (17 - 18)  SpO2: 97% (08 Jul 2024 04:51) (96% - 98%)    Parameters below as of 08 Jul 2024 04:51  Patient On (Oxygen Delivery Method): room air        I&O's Summary    06 Jul 2024 07:01  -  07 Jul 2024 07:00  --------------------------------------------------------  IN: 0 mL / OUT: 200 mL / NET: -200 mL    07 Jul 2024 07:01  -  08 Jul 2024 05:24  --------------------------------------------------------  IN: 100 mL / OUT: 0 mL / NET: 100 mL        General: NAD, non-toxic appearing   HEENT: PERRLA, EOMi, no scleral icterus  CV: RRR, normal S1 and S2, no m/r/g  Lungs: normal respiratory effort. CTAB, no wheezes, rales, or rhonchi  Abd: soft, nontender, nondistended  Ext: no edema, 2+ peripheral pulses   Pysch: AAOx3, appropriate affect   Neuro: grossly non-focal, moving all extremities spontaneously   Skin: no rashes or lesions     LVAD  Speed: (RPM)  Flow:  PI:   Power: (Castro)     LABS:  CAPILLARY BLOOD GLUCOSE                                  15.9   6.28  )-----------( 177      ( 07 Jul 2024 07:12 )             46.7       WBC Trend: 6.28<--, 8.74<--  Hb Trend: 15.9<--, 15.7<--    07-07    140  |  108  |  14  ----------------------------<  98  4.1   |  19<L>  |  0.77    Ca    9.3      07 Jul 2024 07:18    TPro  7.6  /  Alb  4.4  /  TBili  0.6  /  DBili  x   /  AST  21  /  ALT  24  /  AlkPhos  101  07-06    PT/INR - ( 06 Jul 2024 16:44 )   PT: 11.3 sec;   INR: 1.08 ratio         PTT - ( 06 Jul 2024 16:44 )  PTT:29.1 sec      Urinalysis Basic - ( 07 Jul 2024 07:18 )    Color: x / Appearance: x / SG: x / pH: x  Gluc: 98 mg/dL / Ketone: x  / Bili: x / Urobili: x   Blood: x / Protein: x / Nitrite: x   Leuk Esterase: x / RBC: x / WBC x   Sq Epi: x / Non Sq Epi: x / Bacteria: x            RADIOLOGY & ADDITIONAL TESTS: Reviewed Progress Note    07-06-24 (2d)    Patient is a 53y old  Male who presents with a chief complaint of Chest pain (07 Jul 2024 11:30)      Subjective / Overnight Events :  No events overnight.     Additional ROS (if any):    MEDICATIONS  (STANDING):  atorvastatin 10 milliGRAM(s) Oral at bedtime  metoprolol tartrate 25 milliGRAM(s) Oral two times a day    MEDICATIONS  (PRN):  acetaminophen     Tablet .. 650 milliGRAM(s) Oral every 6 hours PRN Temp greater or equal to 38C (100.4F), Mild Pain (1 - 3)  nicotine - 21 mG/24Hr(s) Patch 1 Patch Transdermal daily PRN nicotine craving          PHYSICAL EXAM:  Vital Signs Last 24 Hrs  T(C): 36.3 (08 Jul 2024 04:51), Max: 37.1 (07 Jul 2024 21:47)  T(F): 97.3 (08 Jul 2024 04:51), Max: 98.7 (07 Jul 2024 21:47)  HR: 52 (08 Jul 2024 04:51) (52 - 61)  BP: 112/70 (08 Jul 2024 04:51) (112/70 - 132/77)  BP(mean): --  RR: 17 (08 Jul 2024 04:51) (17 - 18)  SpO2: 97% (08 Jul 2024 04:51) (96% - 98%)    Parameters below as of 08 Jul 2024 04:51  Patient On (Oxygen Delivery Method): room air        I&O's Summary    06 Jul 2024 07:01  -  07 Jul 2024 07:00  --------------------------------------------------------  IN: 0 mL / OUT: 200 mL / NET: -200 mL    07 Jul 2024 07:01  -  08 Jul 2024 05:24  --------------------------------------------------------  IN: 100 mL / OUT: 0 mL / NET: 100 mL        General: NAD, non-toxic appearing   HEENT: PERRLA, EOMi, no scleral icterus  CV: RRR, normal S1 and S2, no m/r/g  Lungs: normal respiratory effort. CTAB, no wheezes, rales, or rhonchi  Abd: soft, nontender, nondistended  Ext: no edema, 2+ peripheral pulses       LABS:  CAPILLARY BLOOD GLUCOSE                                  15.9   6.28  )-----------( 177      ( 07 Jul 2024 07:12 )             46.7       WBC Trend: 6.28<--, 8.74<--  Hb Trend: 15.9<--, 15.7<--    07-07    140  |  108  |  14  ----------------------------<  98  4.1   |  19<L>  |  0.77    Ca    9.3      07 Jul 2024 07:18    TPro  7.6  /  Alb  4.4  /  TBili  0.6  /  DBili  x   /  AST  21  /  ALT  24  /  AlkPhos  101  07-06    PT/INR - ( 06 Jul 2024 16:44 )   PT: 11.3 sec;   INR: 1.08 ratio         PTT - ( 06 Jul 2024 16:44 )  PTT:29.1 sec      Urinalysis Basic - ( 07 Jul 2024 07:18 )    Color: x / Appearance: x / SG: x / pH: x  Gluc: 98 mg/dL / Ketone: x  / Bili: x / Urobili: x   Blood: x / Protein: x / Nitrite: x   Leuk Esterase: x / RBC: x / WBC x   Sq Epi: x / Non Sq Epi: x / Bacteria: x            RADIOLOGY & ADDITIONAL TESTS: Reviewed Progress Note  07-06-24 (2d)      Patient is a 53y old  Male who presents with a chief complaint of Chest pain (07 Jul 2024 11:30)    Subjective / Overnight Events :  No events overnight.       MEDICATIONS  (STANDING):  atorvastatin 10 milliGRAM(s) Oral at bedtime  metoprolol tartrate 25 milliGRAM(s) Oral two times a day    MEDICATIONS  (PRN):  acetaminophen     Tablet .. 650 milliGRAM(s) Oral every 6 hours PRN Temp greater or equal to 38C (100.4F), Mild Pain (1 - 3)  nicotine - 21 mG/24Hr(s) Patch 1 Patch Transdermal daily PRN nicotine craving        PHYSICAL EXAM:  Vital Signs Last 24 Hrs  T(C): 36.3 (08 Jul 2024 04:51), Max: 37.1 (07 Jul 2024 21:47)  T(F): 97.3 (08 Jul 2024 04:51), Max: 98.7 (07 Jul 2024 21:47)  HR: 52 (08 Jul 2024 04:51) (52 - 61)  BP: 112/70 (08 Jul 2024 04:51) (112/70 - 132/77)  RR: 17 (08 Jul 2024 04:51) (17 - 18)  SpO2: 97% (08 Jul 2024 04:51) (96% - 98%)      I&O's Summary  06 Jul 2024 07:01  -  07 Jul 2024 07:00  --------------------------------------------------------  IN: 0 mL / OUT: 200 mL / NET: -200 mL    07 Jul 2024 07:01  -  08 Jul 2024 05:24  --------------------------------------------------------  IN: 100 mL / OUT: 0 mL / NET: 100 mL        General: NAD, non-toxic appearing   HEENT: PERRLA, EOMi, no scleral icterus  CV: RRR, normal S1 and S2, no m/r/g  Lungs: normal respiratory effort. CTAB, no wheezes, rales, or rhonchi  Abd: soft, nontender, nondistended  Ext: no edema, 2+ peripheral pulses       LABS:                    15.9   6.28  )-----------( 177      ( 07 Jul 2024 07:12 )             46.7     WBC Trend: 6.28<--, 8.74<--  Hb Trend: 15.9<--, 15.7<--    07-07  140  |  108  |  14  ----------------------------<  98  4.1   |  19<L>  |  0.77    Ca    9.3      07 Jul 2024 07:18    TPro  7.6  /  Alb  4.4  /  TBili  0.6  /  DBili  x   /  AST  21  /  ALT  24  /  AlkPhos  101  07-06    PT/INR - ( 06 Jul 2024 16:44 )   PT: 11.3 sec;   INR: 1.08 ratio    PTT - ( 06 Jul 2024 16:44 )  PTT:29.1 sec

## 2024-07-08 NOTE — PROVIDER CONTACT NOTE (OTHER) - ACTION/TREATMENT ORDERED:
none, ACP aware.
none, ACP aware, after 30 minutes it got better, pt verbalized relief of discomfort.

## 2024-07-09 VITALS
SYSTOLIC BLOOD PRESSURE: 110 MMHG | DIASTOLIC BLOOD PRESSURE: 65 MMHG | HEART RATE: 56 BPM | OXYGEN SATURATION: 96 % | RESPIRATION RATE: 18 BRPM | TEMPERATURE: 98 F

## 2024-07-09 LAB
HCT VFR BLD CALC: 42.9 % — SIGNIFICANT CHANGE UP (ref 39–50)
HGB BLD-MCNC: 15.1 G/DL — SIGNIFICANT CHANGE UP (ref 13–17)
MCHC RBC-ENTMCNC: 30.9 PG — SIGNIFICANT CHANGE UP (ref 27–34)
MCHC RBC-ENTMCNC: 35.2 GM/DL — SIGNIFICANT CHANGE UP (ref 32–36)
MCV RBC AUTO: 87.9 FL — SIGNIFICANT CHANGE UP (ref 80–100)
NRBC # BLD: 0 /100 WBCS — SIGNIFICANT CHANGE UP (ref 0–0)
PLATELET # BLD AUTO: 172 K/UL — SIGNIFICANT CHANGE UP (ref 150–400)
RBC # BLD: 4.88 M/UL — SIGNIFICANT CHANGE UP (ref 4.2–5.8)
RBC # FLD: 12.5 % — SIGNIFICANT CHANGE UP (ref 10.3–14.5)
WBC # BLD: 6.34 K/UL — SIGNIFICANT CHANGE UP (ref 3.8–10.5)
WBC # FLD AUTO: 6.34 K/UL — SIGNIFICANT CHANGE UP (ref 3.8–10.5)

## 2024-07-09 PROCEDURE — 71045 X-RAY EXAM CHEST 1 VIEW: CPT

## 2024-07-09 PROCEDURE — 82962 GLUCOSE BLOOD TEST: CPT

## 2024-07-09 PROCEDURE — 93308 TTE F-UP OR LMTD: CPT

## 2024-07-09 PROCEDURE — 93005 ELECTROCARDIOGRAM TRACING: CPT

## 2024-07-09 PROCEDURE — 85027 COMPLETE CBC AUTOMATED: CPT

## 2024-07-09 PROCEDURE — 93306 TTE W/DOPPLER COMPLETE: CPT

## 2024-07-09 PROCEDURE — 99285 EMERGENCY DEPT VISIT HI MDM: CPT

## 2024-07-09 PROCEDURE — A9585: CPT

## 2024-07-09 PROCEDURE — 84484 ASSAY OF TROPONIN QUANT: CPT

## 2024-07-09 PROCEDURE — 93356 MYOCRD STRAIN IMG SPCKL TRCK: CPT

## 2024-07-09 PROCEDURE — 36415 COLL VENOUS BLD VENIPUNCTURE: CPT

## 2024-07-09 PROCEDURE — 84443 ASSAY THYROID STIM HORMONE: CPT

## 2024-07-09 PROCEDURE — 85610 PROTHROMBIN TIME: CPT

## 2024-07-09 PROCEDURE — 85730 THROMBOPLASTIN TIME PARTIAL: CPT

## 2024-07-09 PROCEDURE — 85025 COMPLETE CBC W/AUTO DIFF WBC: CPT

## 2024-07-09 PROCEDURE — 96374 THER/PROPH/DIAG INJ IV PUSH: CPT

## 2024-07-09 PROCEDURE — 80048 BASIC METABOLIC PNL TOTAL CA: CPT

## 2024-07-09 PROCEDURE — 80061 LIPID PANEL: CPT

## 2024-07-09 PROCEDURE — 99233 SBSQ HOSP IP/OBS HIGH 50: CPT | Mod: GC

## 2024-07-09 PROCEDURE — 99239 HOSP IP/OBS DSCHRG MGMT >30: CPT

## 2024-07-09 PROCEDURE — 80053 COMPREHEN METABOLIC PANEL: CPT

## 2024-07-09 PROCEDURE — 83036 HEMOGLOBIN GLYCOSYLATED A1C: CPT

## 2024-07-09 PROCEDURE — 75561 CARDIAC MRI FOR MORPH W/DYE: CPT

## 2024-07-09 RX ORDER — METOPROLOL TARTRATE 50 MG
1 TABLET ORAL
Qty: 90 | Refills: 0
Start: 2024-07-09 | End: 2024-10-06

## 2024-07-09 RX ORDER — NICOTINE POLACRILEX 2 MG/1
1 LOZENGE ORAL
Qty: 10 | Refills: 0
Start: 2024-07-09 | End: 2024-07-18

## 2024-07-09 RX ORDER — METOPROLOL TARTRATE 50 MG
1 TABLET ORAL
Qty: 180 | Refills: 0
Start: 2024-07-09 | End: 2024-10-06

## 2024-07-09 RX ORDER — DILTIAZEM HYDROCHLORIDE 240 MG/1
90 CAPSULE, EXTENDED RELEASE ORAL
Refills: 0 | DISCHARGE

## 2024-07-09 RX ORDER — MAGNESIUM OXIDE 400 MG/1
400 TABLET ORAL ONCE
Refills: 0 | Status: COMPLETED | OUTPATIENT
Start: 2024-07-09 | End: 2024-07-09

## 2024-07-09 RX ADMIN — MAGNESIUM OXIDE 400 MILLIGRAM(S): 400 TABLET ORAL at 14:07

## 2024-07-09 RX ADMIN — Medication 25 MILLIGRAM(S): at 13:22

## 2024-07-09 NOTE — DISCHARGE NOTE NURSING/CASE MANAGEMENT/SOCIAL WORK - NSDCPEEMAIL_GEN_ALL_CORE
[facility] for Tobacco Control email [email or web address]

## 2024-07-09 NOTE — PROGRESS NOTE ADULT - PROBLEM SELECTOR PLAN 1
EKG findings with LOLLY in avr, v1, v2 and widespread STD initially concerning for STEMI though troponin flat. POCUS also suggestive of apical HCM which may explain EKG findings and symptoms.   - Cardiology recommendations appreciated  - Cardiology reviewed TTE and cMRI- low c/f atypical HCM  - stable for dc on metoprolol 50mg ER
EKG findings with LOLLY in avr, v1, v2 and widespread STD initially concerning for STEMI though troponin flat. POCUS also suggestive of apical HCM which may explain EKG findings and symptoms.   - Cardiology recommendations appreciated  - Hold ACS management - heparin gtt and DAPT discontinued  - Started metoprolol tartrate 25mg BID  - formal TTE ordered   - F/u cardiology regarding possible ischemic evaluation  - Avoid diuresis, nitroglycerin, arterial vasodilators as can decrease preload and afterload  - Monitor on telemetry  - obtain cardiac MRI to eval for HOCM
EKG findings with LOLLY in avr, v1, v2 and widespread STD initially concerning for STEMI though troponin flat. POCUS also suggestive of apical HCM which may explain EKG findings and symptoms.   - Cardiology recommendations appreciated  - Started metoprolol tartrate 25mg BID  - formal TTE results pending   - Avoid diuresis, nitroglycerin, arterial vasodilators as can decrease preload and afterload  - Monitor on telemetry  - obtain cardiac MRI to eval for HOCM

## 2024-07-09 NOTE — PROGRESS NOTE ADULT - PROBLEM SELECTOR PROBLEM 1
Hypertrophic obstructive cardiomyopathy

## 2024-07-09 NOTE — DISCHARGE NOTE PROVIDER - CARE PROVIDER_API CALL
Trav Rice  Cardiology  [address] [place] [address], [address] [place] [id number]  Phone: [phone]  Fax: [phone]  Follow Up Time:

## 2024-07-09 NOTE — PROGRESS NOTE ADULT - PROBLEM SELECTOR PLAN 3
DVT ppx - low VTE risk  Diet - DASH  Dispo - pending cardiology     Family at bedside.
DVT ppx - low VTE risk  Diet - DASH  Dispo - pending
DVT ppx - low VTE risk  Diet - DASH      Stable for dc with outpatient cardio f/u. d/w wife at bedside and niece over the phone.

## 2024-07-09 NOTE — PROGRESS NOTE ADULT - SUBJECTIVE AND OBJECTIVE BOX
[facility] Division of Hospital Medicine  Sharon Galvan DO   Available via Microsoft Teams      Patient is a 53y old  Male who presents with a chief complaint of Chest pain (09 Jul 2024 13:13)      SUBJECTIVE / OVERNIGHT EVENTS:  Declined translation services  No CP, SOB, dizziness     MEDICATIONS  (STANDING):  atorvastatin 10 milliGRAM(s) Oral at bedtime  metoprolol tartrate 25 milliGRAM(s) Oral two times a day    MEDICATIONS  (PRN):  acetaminophen     Tablet .. 650 milliGRAM(s) Oral every 6 hours PRN Temp greater or equal to 38C (100.4F), Mild Pain (1 - 3)  nicotine - 21 mG/24Hr(s) Patch 1 Patch Transdermal daily PRN nicotine craving      CAPILLARY BLOOD GLUCOSE      POCT Blood Glucose.: 90 mg/dL (08 Jul 2024 17:21)    I&O's Summary    08 Jul 2024 07:01  -  09 Jul 2024 07:00  --------------------------------------------------------  IN: 1090 mL / OUT: 0 mL / NET: 1090 mL    09 Jul 2024 07:01  -  09 Jul 2024 15:39  --------------------------------------------------------  IN: 180 mL / OUT: 0 mL / NET: 180 mL        PHYSICAL EXAM:  Vital Signs Last 24 Hrs  T(C): 36.3 (09 Jul 2024 11:33), Max: 36.4 (08 Jul 2024 20:57)  T(F): 97.3 (09 Jul 2024 11:33), Max: 97.6 (09 Jul 2024 05:28)  HR: 58 (09 Jul 2024 11:33) (58 - 60)  BP: 113/68 (09 Jul 2024 11:33) (113/68 - 121/69)  BP(mean): 86 (08 Jul 2024 20:57) (86 - 86)  RR: 18 (09 Jul 2024 11:33) (18 - 18)  SpO2: 96% (09 Jul 2024 11:33) (96% - 98%)    Parameters below as of 09 Jul 2024 11:33  Patient On (Oxygen Delivery Method): room air      CONSTITUTIONAL: NAD, well-developed, well-groomed  ENMT: Moist oral mucosa  RESPIRATORY: Normal respiratory effort; lungs are clear to auscultation bilaterally  CARDIOVASCULAR: Regular rate and rhythm, normal S1 and S2; No lower extremity edema  ABDOMEN: Nontender to palpation, normoactive bowel sounds, no rebound/guarding  MUSCULOSKELETAL:  No clubbing or cyanosis of digits; no joint swelling or tenderness to palpation  PSYCH: A+O to person, place, and time; affect appropriate  SKIN: No rashes; no palpable lesions    LABS:                        15.1   6.34  )-----------( 172      ( 09 Jul 2024 07:13 )             42.9                       RADIOLOGY & ADDITIONAL TESTS:  Results Reviewed:   Imaging Personally Reviewed:  Electrocardiogram Personally Reviewed:    COORDINATION OF CARE:  Care Discussed with Consultants/Other Providers [Y/N]:  Prior or Outpatient Records Reviewed [Y/N]:

## 2024-07-09 NOTE — DISCHARGE NOTE PROVIDER - NSFOLLOWUPCLINICS_GEN_ALL_ED_FT
Cardiology at [facility]  Cardiology  [address] [place] [address]  Phone: [phone]  Fax:   Follow Up Time: 2 weeks

## 2024-07-09 NOTE — PROGRESS NOTE ADULT - ASSESSMENT
54 yo M w/ PMH of active smoking, HTN and HLD.   Presented with SOB/chest pressure. EKG changes seen, likely secondary to severe LVH, with concern for ?apical HCM on POCUS. Will need formal TTE to evaluate intracavitary gradients. May need eventual primary prevention ICD if truly apical HCM.     Cardiac studies:  cMRI 7/8 - no LGE; thickening/hypertrophy primarily involving mid anterior (13mm) and anteroseptal walls (12mm) of the LV; EF 66%  TTE 7/8 - asymmetric thickening of the LV apical wall; LVEF >75%, resting intracavity gradient of at least 25mmHg; appears to be chordal motion toward the basal septum likely causing some flow obstruction; resting peak LVOT gradient is 18mmHg    REC:  #1.  chest pressure  - trops wnl, EKG findings iso severe LVH   - no concern for ACS at this moment     #2.  cardiomyopathy  TTE showing asymmetric thickening of LV apical wall; however, cMRI showing primarily mid anterior and anteroseptal walls (13mm end-diastolic thickness), TTE with peak LVOT gradient of 18mmHg with possible flow obstruction  - c/w Lopressor 25mg BID, ensure patient is not symptomatic when ambulating and has appropriate HR response   - no DESTINEY as documented on cardiac imaging, peak LVOT gradient at 18mmHg, not diagnostic for obstructive HoCM  - no apical variant as per cMRI   - would recommend follow-up with Dr. Rice for HoCM clinic, although patient is only visiting the [facility] from [place]     Case discussed with Dr. Boyd and Dr. Louie.     Omar Kaur MD  Cardiology resident     For Cardiology consults and questions, all Cardiology service information can be found 24/7 on [email or web address] - use password: [email or web address] to log in.    NOTE INCOMPLETE UNTIL ATTENDING ATTESTATION   54 yo M w/ PMH of active smoking, HTN and HLD.   Presented with SOB/chest pressure. EKG changes seen, likely secondary to severe LVH, with concern for ?apical HCM on POCUS. Will need formal TTE to evaluate intracavitary gradients. May need eventual primary prevention ICD if truly apical HCM.     Cardiac studies:  cMRI 7/8 - no LGE; thickening/hypertrophy primarily involving mid anterior (13mm) and anteroseptal walls (12mm) of the LV; EF 66%  TTE 7/8 - asymmetric thickening of the LV apical wall; LVEF >75%, resting intracavity gradient of at least 25mmHg; appears to be chordal motion toward the basal septum likely causing some flow obstruction; resting peak LVOT gradient is 18mmHg    REC:  #1.  chest pressure  - trops wnl, EKG findings iso severe LVH   - no concern for ACS at this moment     #2.  cardiomyopathy  TTE showing asymmetric thickening of LV apical wall; however, cMRI showing primarily mid anterior and anteroseptal walls (13mm end-diastolic thickness), TTE with peak LVOT gradient of 18mmHg with possible flow obstruction  - c/w Lopressor 25mg BID, ensure patient is not symptomatic when ambulating and has appropriate HR response   - no DESTINEY as documented on cardiac imaging, peak LVOT gradient at 18mmHg does not warrant invasive treatment   - no apical variant as per cMRI   - would recommend follow-up with Dr. Rice for HoC clinic, although patient is only visiting the [facility] from [place]     Case discussed with Dr. Boyd and Dr. Louie.     Omar Kaur MD  Cardiology resident     For Cardiology consults and questions, all Cardiology service information can be found 24/7 on [email or web address] - use password: [email or web address] to log in.    NOTE INCOMPLETE UNTIL ATTENDING ATTESTATION   52 yo M w/ PMH of active smoking, HTN and HLD.   Presented with SOB/chest pressure. EKG changes seen, likely secondary to severe LVH, with concern for ?apical HCM on POCUS. Will need formal TTE to evaluate intracavitary gradients. May need eventual primary prevention ICD if truly apical HCM.     Cardiac studies:  cMRI 7/8 - no LGE; thickening/hypertrophy primarily involving mid anterior (13mm) and anteroseptal walls (12mm) of the LV; EF 66%  TTE 7/8 - asymmetric thickening of the LV apical wall; LVEF >75%, resting intracavity gradient of at least 25mmHg; appears to be chordal motion toward the basal septum likely causing some flow obstruction; resting peak LVOT gradient is 18mmHg    REC:  #1.  chest pressure  - trops wnl, EKG findings iso severe LVH   - no concern for ACS at this moment     #2.  cardiomyopathy  TTE showing asymmetric thickening of LV apical wall; however, cMRI showing primarily mid anterior and anteroseptal walls (13mm end-diastolic thickness), TTE with peak LVOT gradient of 18mmHg with possible flow obstruction  - switched to Toprol 50mg BID, ensure patient is not symptomatic when ambulating and has appropriate HR response   - no DESTINEY as documented on cardiac imaging, peak LVOT gradient at 18mmHg does not warrant invasive treatment   - no apical variant as per cMRI   - would recommend follow-up with cardiology fellow clinic   - no contraindications to discharge      Case discussed with Dr. Boyd and Dr. Louie.     Omar Kaur MD  Cardiology resident     For Cardiology consults and questions, all Cardiology service information can be found 24/7 on [email or web address] - use password: [email or web address] to log in.    NOTE INCOMPLETE UNTIL ATTENDING ATTESTATION   52 yo M w/ PMH of active smoking, HTN and HLD.   Presented with SOB/chest pressure. EKG changes seen, likely secondary to severe LVH, with concern for ?apical HCM on POCUS. Will need formal TTE to evaluate intracavitary gradients. May need eventual primary prevention ICD if truly apical HCM.     Cardiac studies:  cMRI 7/8 - no LGE; thickening/hypertrophy primarily involving mid anterior (13mm) and anteroseptal walls (12mm) of the LV; EF 66%  TTE 7/8 - asymmetric thickening of the LV apical wall; LVEF >75%, resting intracavity gradient of at least 25mmHg; appears to be chordal motion toward the basal septum likely causing some flow obstruction; resting peak LVOT gradient is 18mmHg    REC:  #1.  chest pressure  - trops wnl, EKG findings iso severe LVH   - no concern for ACS at this moment     #2.  cardiomyopathy  TTE showing asymmetric thickening of LV apical wall; however, cMRI showing primarily mid anterior and anteroseptal walls (13mm end-diastolic thickness), TTE with peak LVOT gradient of 18mmHg with possible flow obstruction  - switched to Toprol 50mg daily, ensure patient is not symptomatic when ambulating and has appropriate HR response   - no DESTINEY as documented on cardiac imaging, peak LVOT gradient at 18mmHg does not warrant invasive treatment   - no apical variant as per cMRI   - would recommend follow-up with cardiology fellow clinic   - no contraindications to discharge      Case discussed with Dr. Boyd and Dr. Louie.     Omar Kaur MD  Cardiology resident     For Cardiology consults and questions, all Cardiology service information can be found 24/7 on [email or web address] - use password: [email or web address] to log in.    NOTE INCOMPLETE UNTIL ATTENDING ATTESTATION   52 yo M w/ PMH of active smoking, HTN and HLD.   Presented with SOB/chest pressure. EKG changes seen, likely secondary to severe LVH, with concern for ?apical HCM on POCUS.     Cardiac studies:  cMRI 7/8 - no LGE; thickening/hypertrophy primarily involving mid anterior (13mm) and anteroseptal walls (12mm) of the LV; EF 66%  TTE 7/8 - asymmetric thickening of the LV apical wall; LVEF >75%, resting intracavity gradient of at least 25mmHg; appears to be chordal motion toward the basal septum likely causing some flow obstruction; resting peak LVOT gradient is 18mmHg    REC:  #1.  chest pressure  - trops wnl, EKG findings iso severe LVH   - no concern for ACS at this moment     #2. ?Hypertrophic cardiomyopathy -  TTE showing asymmetric thickening of LV apical wall; however, cMRI showing primarily mid anterior and anteroseptal walls (13mm end-diastolic thickness), TTE with peak LVOT gradient of 18mmHg  - switched to Toprol 50mg daily, ensure patient is not symptomatic when ambulating and has appropriate HR response   - no DESTINEY as documented on cardiac imaging, peak LVOT gradient at 18mmHg; does not warrant invasive or more aggressive medical treatment   - no apical variant as per cMRI   - would recommend follow-up with cardiology fellow clinic   - no contraindications to discharge      Case discussed with Dr. Boyd and Dr. Louie.     Omar Kaur MD  Cardiology resident     Plan discussed with cardiology resident and fellow.  Patient seen and examined.  Hx., exam and labs as above.  I agree with the assessment and recommendations, which I have reviewed and edited where appropriate.  Jovani Boyd M.D.  Cardiology Attending, Consult Service    For Cardiology consults and questions, all Cardiology service information can be found 24/7 on [email or web address] - use password: [email or web address] to log in.

## 2024-07-09 NOTE — PROGRESS NOTE ADULT - NSPROGADDITIONALINFOA_GEN_ALL_CORE
Plan discussed with NP Breonna Lee, DO  Available on Teams imelda    DC depending on results of echo and cMRI.
kaitlyn Wong

## 2024-07-09 NOTE — DISCHARGE NOTE PROVIDER - HOSPITAL COURSE
HPI:  53 year old male with hx of HLD, active smoker, presenting with intermittent chest pain/pressure and SOB. Patient visiting [place] from [place], arrived about 3 weeks ago. Patient endorses intermittent chest pressure and pain for the past 3 years but recently, his symptoms have become more frequent and severe. Pt notes severe midsternal chest pressure that would occur randomly without any specific trigger. He has seen a doctor in [place] and was told that he had a "fat heart". He was prescribed diltiazem 90mg qd but has not been taking it consistently. He smokes actively, about 10+cigarettes daily for the past 30 years. He drinks alcohol occasionally.     In the ED, on arrival, EKG noted to have LOLLY in leads avr, v1, v2 with widespread TWI. POCUS in ED concerning for apical HCM. Pt evaluated by cardiology. Initially started on heparin gtt and DAPT for ACS but was stopped since EKG findings likely due to HCM. Troponin 17 --> 17. Pt admitted for further management.  (06 Jul 2024 20:33)    Hospital Course:  Presented with SOB/chest pressure. EKG changes seen, likely secondary to severe LVH, with concern for ?apical HCM on POCUS.     Cardiac studies:  cMRI 7/8 - no LGE; thickening/hypertrophy primarily involving mid anterior (13mm) and anteroseptal walls (12mm) of the LV; EF 66%  TTE 7/8 - asymmetric thickening of the LV apical wall; LVEF >75%, resting intracavity gradient of at least 25mmHg; appears to be chordal motion toward the basal septum likely causing some flow obstruction; resting peak LVOT gradient is 18mmHg      #1.  chest pressure  - trops wnl, EKG findings iso severe LVH   - no concern for ACS at this moment     #2.  cardiomyopathy  TTE showing asymmetric thickening of LV apical wall; however, cMRI showing primarily mid anterior and anteroseptal walls (13mm end-diastolic thickness), TTE with peak LVOT gradient of 18mmHg with possible flow obstruction  - switched to Toprol 50mg BID, ensure patient is not symptomatic when ambulating and has appropriate HR response   - no DESTINEY as documented on cardiac imaging, peak LVOT gradient at 18mmHg does not warrant invasive treatment   - no apical variant as per cMRI   - would recommend follow-up with cardiology fellow clinic   - no contraindications to discharge      Case discussed with Dr. Boyd and Dr. Louie.     Omar Kaur MD  Cardiology resident     For Cardiology consults and questions, all Cardiology service information can be found 24/7 on [email or web address] - use password: [email or web address] to log in.    NOTE INCOMPLETE UNTIL ATTENDING ATTESTATION        Important Medication Changes and Reason:    Active or Pending Issues Requiring Follow-up:    Advanced Directives:   [ ] Full code  [ ] DNR  [ ] Hospice    Discharge Diagnoses:         HPI:  53 year old male with hx of HLD, active smoker, presenting with intermittent chest pain/pressure and SOB. Patient visiting [place] from [place], arrived about 3 weeks ago. Patient endorses intermittent chest pressure and pain for the past 3 years but recently, his symptoms have become more frequent and severe. Pt notes severe midsternal chest pressure that would occur randomly without any specific trigger. He has seen a doctor in [place] and was told that he had a "fat heart". He was prescribed diltiazem 90mg qd but has not been taking it consistently. He smokes actively, about 10+cigarettes daily for the past 30 years. He drinks alcohol occasionally.     In the ED, on arrival, EKG noted to have LOLLY in leads avr, v1, v2 with widespread TWI. POCUS in ED concerning for apical HCM. Pt evaluated by cardiology. Initially started on heparin gtt and DAPT for ACS but was stopped since EKG findings likely due to HCM. Troponin 17 --> 17. Pt admitted for further management.  (06 Jul 2024 20:33)    Hospital Course:  Presented with SOB/chest pressure. EKG changes seen, likely secondary to severe LVH, with concern for ?apical HCM on POCUS.     Cardiac studies:  cMRI 7/8 - no LGE; thickening/hypertrophy primarily involving mid anterior (13mm) and anteroseptal walls (12mm) of the LV; EF 66%  TTE 7/8 - asymmetric thickening of the LV apical wall; LVEF >75%, resting intracavity gradient of at least 25mmHg; appears to be chordal motion toward the basal septum likely causing some flow obstruction; resting peak LVOT gradient is 18mmHg      #1.  chest pressure  - trops wnl, EKG findings iso severe LVH   - no concern for ACS at this moment     #2.  cardiomyopathy  TTE showing asymmetric thickening of LV apical wall; however, cMRI showing primarily mid anterior and anteroseptal walls (13mm end-diastolic thickness), TTE with peak LVOT gradient of 18mmHg with possible flow obstruction  - switched to Toprol 50mg BID, ensure patient is not symptomatic when ambulating and has appropriate HR response   - no DESTINEY as documented on cardiac imaging, peak LVOT gradient at 18mmHg does not warrant invasive treatment   - no apical variant as per cMRI   - would recommend follow-up with cardiology fellow clinic   - no contraindications to discharge        Important Medication Changes and Reason:  > Toprol 50 mg PO bid  Active or Pending Issues Requiring Follow-up:  > cardiology clinic follow up   Advanced Directives:   [x] Full code  [ ] DNR  [ ] Hospice    Discharge Diagnoses:  Chest pain  cardiomyopathy

## 2024-07-09 NOTE — PROGRESS NOTE ADULT - PROBLEM SELECTOR PLAN 2
- Continue statin  - A1C 5.7, TSH wnl
- Continue statin  - A1C 5.7, lipid panel pending, TSH wnl
- Continue statin  - A1C 5.7, lipid panel pending, TSH wnl

## 2024-07-09 NOTE — DISCHARGE NOTE NURSING/CASE MANAGEMENT/SOCIAL WORK - NSDCPEWEB_GEN_ALL_CORE
[facility] for Tobacco Control website --- [email or web address] --- [email or web address]

## 2024-07-09 NOTE — DISCHARGE NOTE PROVIDER - NSDCCPCAREPLAN_GEN_ALL_CORE_FT
PRINCIPAL DISCHARGE DIAGNOSIS  Diagnosis: Chest pressure  Assessment and Plan of Treatment:      PRINCIPAL DISCHARGE DIAGNOSIS  Diagnosis: Chest pressure  Assessment and Plan of Treatment: you had negative cardiacenzymes  you did not have acute coronary syndrome      SECONDARY DISCHARGE DIAGNOSES  Diagnosis: Hypertrophic obstructive cardiomyopathy  Assessment and Plan of Treatment: continue [facility]  follow up with cardiology

## 2024-07-09 NOTE — PROGRESS NOTE ADULT - SUBJECTIVE AND OBJECTIVE BOX
Progress Note    07-06-24 (3d)    Patient is a 53y old  Male who presents with a chief complaint of Chest pain (08 Jul 2024 17:36)      Subjective / Overnight Events :  - No acute events overnight.  - Pt seen and examined at bedside.     Additional ROS (if any):    MEDICATIONS  (STANDING):  atorvastatin 10 milliGRAM(s) Oral at bedtime  metoprolol tartrate 25 milliGRAM(s) Oral two times a day    MEDICATIONS  (PRN):  acetaminophen     Tablet .. 650 milliGRAM(s) Oral every 6 hours PRN Temp greater or equal to 38C (100.4F), Mild Pain (1 - 3)  nicotine - 21 mG/24Hr(s) Patch 1 Patch Transdermal daily PRN nicotine craving          PHYSICAL EXAM:  Vital Signs Last 24 Hrs  T(C): 36.4 (08 Jul 2024 20:57), Max: 36.4 (08 Jul 2024 20:57)  T(F): 97.5 (08 Jul 2024 20:57), Max: 97.5 (08 Jul 2024 20:57)  HR: 60 (08 Jul 2024 20:57) (60 - 60)  BP: 121/69 (08 Jul 2024 20:57) (121/69 - 121/69)  BP(mean): 86 (08 Jul 2024 20:57) (86 - 86)  RR: 18 (08 Jul 2024 20:57) (18 - 18)  SpO2: 98% (08 Jul 2024 20:57) (98% - 98%)    Parameters below as of 08 Jul 2024 20:57  Patient On (Oxygen Delivery Method): room air        I&O's Summary    07 Jul 2024 07:01  -  08 Jul 2024 07:00  --------------------------------------------------------  IN: 100 mL / OUT: 0 mL / NET: 100 mL    08 Jul 2024 07:01  -  09 Jul 2024 05:09  --------------------------------------------------------  IN: 1090 mL / OUT: 0 mL / NET: 1090 mL        General: NAD, non-toxic appearing   HEENT: PERRLA, EOMi, no scleral icterus  CV: RRR, normal S1 and S2, no m/r/g  Lungs: normal respiratory effort. CTAB, no wheezes, rales, or rhonchi  Abd: soft, nontender, nondistended  Ext: no edema, 2+ peripheral pulses     LABS:  CAPILLARY BLOOD GLUCOSE      POCT Blood Glucose.: 90 mg/dL (08 Jul 2024 17:21)                              15.1   6.19  )-----------( 163      ( 08 Jul 2024 07:15 )             42.9       WBC Trend: 6.19<--, 6.28<--, 8.74<--  Hb Trend: 15.1<--, 15.9<--, 15.7<--    07-07    140  |  108  |  14  ----------------------------<  98  4.1   |  19<L>  |  0.77    Ca    9.3      07 Jul 2024 07:18            Urinalysis Basic - ( 07 Jul 2024 07:18 )    Color: x / Appearance: x / SG: x / pH: x  Gluc: 98 mg/dL / Ketone: x  / Bili: x / Urobili: x   Blood: x / Protein: x / Nitrite: x   Leuk Esterase: x / RBC: x / WBC x   Sq Epi: x / Non Sq Epi: x / Bacteria: x            RADIOLOGY & ADDITIONAL TESTS: Reviewed Progress Note    07-06-24 (3d)    Patient is a 53y old  Male who presented with a chief complaint of Chest pain (08 Jul 2024 17:36)    Subjective / Overnight Events :  - No acute events overnight.  - Pt seen and examined at bedside.     ROS:  Neg.    MEDICATIONS  (STANDING):  atorvastatin 10 milliGRAM(s) Oral at bedtime  metoprolol tartrate 25 milliGRAM(s) Oral two times a day    MEDICATIONS  (PRN):  acetaminophen     Tablet .. 650 milliGRAM(s) Oral every 6 hours PRN Temp greater or equal to 38C (100.4F), Mild Pain (1 - 3)  nicotine - 21 mG/24Hr(s) Patch 1 Patch Transdermal daily PRN nicotine craving      PHYSICAL EXAM:  Vital Signs Last 24 Hrs  T(C): 36.4 (08 Jul 2024 20:57), Max: 36.4 (08 Jul 2024 20:57)  T(F): 97.5 (08 Jul 2024 20:57), Max: 97.5 (08 Jul 2024 20:57)  HR: 60 (08 Jul 2024 20:57) (60 - 60)  BP: 121/69 (08 Jul 2024 20:57) (121/69 - 121/69)  BP(mean): 86 (08 Jul 2024 20:57) (86 - 86)  RR: 18 (08 Jul 2024 20:57) (18 - 18)  SpO2: 98% (08 Jul 2024 20:57) (98% - 98%)    I&O's Summary  07 Jul 2024 07:01  -  08 Jul 2024 07:00  --------------------------------------------------------  IN: 100 mL / OUT: 0 mL / NET: 100 mL    08 Jul 2024 07:01  -  09 Jul 2024 05:09  --------------------------------------------------------  IN: 1090 mL / OUT: 0 mL / NET: 1090 mL      General: NAD, non-toxic appearing   HEENT: PERRLA, EOMi, no scleral icterus  CV: RRR, normal S1 and S2, no m/r/g  Lungs: normal respiratory effort. CTAB, no wheezes, rales, or rhonchi  Abd: soft, nontender, nondistended  Ext: no edema, 2+ peripheral pulses     LABS:  CAPILLARY BLOOD GLUCOSE  POCT Blood Glucose.: 90 mg/dL (08 Jul 2024 17:21)                         15.1   6.19  )-----------( 163      ( 08 Jul 2024 07:15 )             42.9       WBC Trend: 6.19<--, 6.28<--, 8.74<--  Hb Trend: 15.1<--, 15.9<--, 15.7<--    07-07    140  |  108  |  14  ----------------------------<  98  4.1   |  19<L>  |  0.77    Ca    9.3      07 Jul 2024 07:18

## 2024-07-09 NOTE — DISCHARGE NOTE NURSING/CASE MANAGEMENT/SOCIAL WORK - PATIENT PORTAL LINK FT
You can access the FollowMyHealth Patient Portal offered by [facility] by registering at the following website: [email or web address]. By joining [facility]’s FollowMyHealth portal, you will also be able to view your health information using other applications (apps) compatible with our system.

## 2024-07-09 NOTE — DISCHARGE NOTE PROVIDER - NSDCMRMEDTOKEN_GEN_ALL_CORE_FT
atorvastatin 10 mg oral tablet: 1 tab(s) orally once a day (at bedtime)  metoprolol tartrate 25 mg oral tablet: 1 tab(s) orally 2 times a day  nicotine 21 mg/24 hr transdermal film, extended release: 1 patch transdermal once a day   atorvastatin 10 mg oral tablet: 1 tab(s) orally once a day (at bedtime)  nicotine 21 mg/24 hr transdermal film, extended release: 1 patch transdermal once a day  Toprol-XL 50 mg oral tablet, extended release: 1 tab(s) orally once a day

## 2024-07-10 ENCOUNTER — NON-APPOINTMENT (OUTPATIENT)
Age: 54
End: 2024-07-10

## 2024-07-10 ENCOUNTER — TRANSCRIPTION ENCOUNTER (OUTPATIENT)
Age: 54
End: 2024-07-10

## 2024-07-10 PROBLEM — Z00.00 ENCOUNTER FOR PREVENTIVE HEALTH EXAMINATION: Status: ACTIVE | Noted: 2024-07-10

## 2025-02-07 ENCOUNTER — APPOINTMENT (OUTPATIENT)
Dept: ELECTROPHYSIOLOGY | Facility: CLINIC | Age: 55
End: 2025-02-07

## 2025-02-07 ENCOUNTER — NON-APPOINTMENT (OUTPATIENT)
Age: 55
End: 2025-02-07

## 2025-02-07 ENCOUNTER — APPOINTMENT (OUTPATIENT)
Dept: CARDIOLOGY | Facility: CLINIC | Age: 55
End: 2025-02-07

## 2025-02-07 VITALS
WEIGHT: 150 LBS | DIASTOLIC BLOOD PRESSURE: 75 MMHG | OXYGEN SATURATION: 98 % | SYSTOLIC BLOOD PRESSURE: 130 MMHG | HEART RATE: 56 BPM | BODY MASS INDEX: 22.73 KG/M2 | HEIGHT: 68.11 IN

## 2025-02-07 DIAGNOSIS — I42.2 OTHER HYPERTROPHIC CARDIOMYOPATHY: ICD-10-CM

## 2025-02-07 DIAGNOSIS — E78.5 HYPERLIPIDEMIA, UNSPECIFIED: ICD-10-CM

## 2025-02-07 PROCEDURE — 93000 ELECTROCARDIOGRAM COMPLETE: CPT

## 2025-02-07 PROCEDURE — 99215 OFFICE O/P EST HI 40 MIN: CPT | Mod: 25

## 2025-02-07 RX ORDER — METOPROLOL SUCCINATE 50 MG/1
50 TABLET, EXTENDED RELEASE ORAL DAILY
Refills: 0 | Status: ACTIVE | COMMUNITY
Start: 2025-02-07

## 2025-02-07 RX ORDER — ATORVASTATIN CALCIUM 10 MG/1
10 TABLET, FILM COATED ORAL DAILY
Refills: 0 | Status: ACTIVE | COMMUNITY
Start: 2025-02-07

## 2025-02-14 LAB — NT-PROBNP SERPL-MCNC: 398 PG/ML

## 2025-03-21 PROCEDURE — 93248 EXT ECG>7D<15D REV&INTERPJ: CPT

## 2025-08-15 ENCOUNTER — APPOINTMENT (OUTPATIENT)
Dept: CARDIOLOGY | Facility: CLINIC | Age: 55
End: 2025-08-15

## 2025-08-15 VITALS
DIASTOLIC BLOOD PRESSURE: 74 MMHG | BODY MASS INDEX: 22.73 KG/M2 | SYSTOLIC BLOOD PRESSURE: 127 MMHG | WEIGHT: 150 LBS | OXYGEN SATURATION: 99 % | HEART RATE: 58 BPM

## 2025-08-15 PROCEDURE — 99214 OFFICE O/P EST MOD 30 MIN: CPT | Mod: 25

## 2025-08-15 PROCEDURE — 93000 ELECTROCARDIOGRAM COMPLETE: CPT
